# Patient Record
Sex: FEMALE | Race: WHITE
[De-identification: names, ages, dates, MRNs, and addresses within clinical notes are randomized per-mention and may not be internally consistent; named-entity substitution may affect disease eponyms.]

---

## 2018-06-23 ENCOUNTER — HOSPITAL ENCOUNTER (EMERGENCY)
Dept: HOSPITAL 97 - ER | Age: 28
Discharge: HOME | End: 2018-06-23
Payer: SELF-PAY

## 2018-06-23 DIAGNOSIS — L03.314: Primary | ICD-10-CM

## 2018-06-23 DIAGNOSIS — F17.210: ICD-10-CM

## 2018-06-23 DIAGNOSIS — Z88.8: ICD-10-CM

## 2018-06-23 PROCEDURE — 99284 EMERGENCY DEPT VISIT MOD MDM: CPT

## 2018-06-23 NOTE — EDPHYS
Physician Documentation                                                                           

 [facility]                                                                

Name: Glenys Lala                                                                             

Age: 28 yrs                                                                                       

Sex: Female                                                                                       

: 1990                                                                                   

MRN: [id number]                                                                                   

Arrival Date: 2018                                                                          

Time: 10:44                                                                                       

Account#: [id number]                                                                            

Bed 15                                                                                            

Private MD: None, None                                                                            

ED Physician Jason Brown                                                                       

HPI:                                                                                              

                                                                                             

11:04 This 28 yrs old  Female presents to ER via Ambulatory with complaints of Rash. snw 

11:04 The patient's rash thought to be caused by tenderness to bilateral labia majora, hx of  snw 

      abscesses to same area. The rash is located on the left labia majora and right labia        

      majora. The rash can be described as tenderness. Onset: The symptoms/episode                

      began/occurred gradually, 1 week(s) ago, and became persistent. Severity of symptoms:       

      At their worst the symptoms were mild moderate. Treatment given at home: sitz baths.        

      The patient has experienced similar episodes in the past. It is unknown whether or not      

      the patient has recently seen a physician.                                                  

                                                                                                  

OB/GYN:                                                                                           

10:50 LMP 2013                                                                            jl7 

                                                                                                  

Historical:                                                                                       

- Allergies:                                                                                      

11:01 Iodine;                                                                                 jl7 

- Home Meds:                                                                                      

11:01 None [Active];                                                                          jl7 

- PMHx:                                                                                           

11:01 "mental health"; Bipolar disorder; UTI;                                                 jl7 

- PSHx:                                                                                           

11:01 None;                                                                                   jl7 

                                                                                                  

- Immunization history:: Adult Immunizations not up to date.                                      

- Social history:: Smoking status: Patient uses tobacco products, smokes one-half pack            

  cigarettes per day.                                                                             

- Ebola Screening: : No symptoms or risks identified at this time.                                

                                                                                                  

                                                                                                  

ROS:                                                                                              

11:02 Constitutional: Negative for fever, chills, and weight loss, Eyes: Negative for injury, snw 

      pain, redness, and discharge, ENT: Negative for injury, pain, and discharge, Neck:          

      Negative for injury, pain, and swelling, Cardiovascular: Negative for chest pain,           

      palpitations, and edema, Respiratory: Negative for shortness of breath, cough,              

      wheezing, and pleuritic chest pain, Abdomen/GI: Negative for abdominal pain, nausea,        

      vomiting, diarrhea, and constipation, Back: Negative for injury and pain, MS/Extremity:     

      Negative for injury and deformity, Skin: Negative for injury, rash, and discoloration,      

      Neuro: Negative for headache, weakness, numbness, tingling, and seizure.                    

11:02 : Positive for of the right labia majora and left labia majora, tenderness, possible      

      abscess.                                                                                    

                                                                                                  

Exam:                                                                                             

10:59 Constitutional:  This is a well developed, well nourished patient who is awake, alert,  snw 

      and in no acute distress. Head/Face:  Normocephalic, atraumatic. Eyes:  Pupils equal        

      round and reactive to light, extra-ocular motions intact.  Lids and lashes normal.          

      Conjunctiva and sclera are non-icteric and not injected.  Cornea within normal limits.      

      Periorbital areas with no swelling, redness, or edema. ENT:  Nares patent. No nasal         

      discharge, no septal abnormalities noted.  Tympanic membranes are normal and external       

      auditory canals are clear.  Oropharynx with no redness, swelling, or masses, exudates,      

      or evidence of obstruction, uvula midline.  Mucous membranes moist. Neck:  Trachea          

      midline, no thyromegaly or masses palpated, and no cervical lymphadenopathy.  Supple,       

      full range of motion without nuchal rigidity, or vertebral point tenderness.  No            

      Meningismus. Chest/axilla:  Normal chest wall appearance and motion.  Nontender with no     

      deformity.  No lesions are appreciated. Cardiovascular:  Regular rate and rhythm with a     

      normal S1 and S2.  No gallops, murmurs, or rubs.  Normal PMI, no JVD.  No pulse             

      deficits. Respiratory:  Lungs have equal breath sounds bilaterally, clear to                

      auscultation and percussion.  No rales, rhonchi or wheezes noted.  No increased work of     

      breathing, no retractions or nasal flaring. Abdomen/GI:  Soft, non-tender, with normal      

      bowel sounds.  No distension or tympany.  No guarding or rebound.  No evidence of           

      tenderness throughout. Back:  No spinal tenderness.  No costovertebral tenderness.          

      Full range of motion. Pelvic Exam:  Normal external genitalia with tenderness to            

      bilateral labia majora, old scarring noted.  Mucoid discharge, no bleeding noted Skin:      

      Warm, dry with normal turgor.  Normal color with no rashes, no lesions, and no evidence     

      of cellulitis. MS/ Extremity:  Pulses equal, no cyanosis.  Neurovascular intact.  Full,     

      normal range of motion. Neuro:  Awake and alert, GCS 15, oriented to person, place,         

      time, and situation.  Cranial nerves II-XII grossly intact.  Motor strength 5/5 in all      

      extremities.  Sensory grossly intact.  Cerebellar exam normal.  Normal gait. Psych:         

      Awake, alert, with orientation to person, place and time.  Behavior, mood, and affect       

      are within normal limits.                                                                   

                                                                                                  

Vital Signs:                                                                                      

10:50  / 87; Pulse 89; Resp 16 S; Temp 98.7(O); Pulse Ox 99% on R/A; Weight 63.5 kg     jl7 

      (R); Height 5 ft. 2 in. (157.48 cm) (R); Pain 7/10;                                         

10:50 Body Mass Index 25.61 (63.50 kg, 157.48 cm)                                             7 

                                                                                                  

MDM:                                                                                              

10:53 Patient medically screened.                                                             snw 

11:15 Data reviewed: vital signs, nurses notes. Data interpreted: Pulse oximetry: on room air snw 

      is 99 %. Interpretation: normal. Test interpretation: by ED physician or midlevel           

      provider:. Counseling: I had a detailed discussion with the patient and/or guardian         

      regarding: the historical points, exam findings, and any diagnostic results supporting      

      the discharge/admit diagnosis, the presence of at least one elevated blood pressure         

      reading (>120/80) during this emergency department visit, the need for outpatient           

      follow up, for definitive care, to return to the emergency department if symptoms           

      worsen or persist or if there are any questions or concerns that arise at home. Special     

      discussion: Based on the history and exam findings, there is no indication for further      

      emergent testing or inpatient evaluation. I discussed with the patient/guardian the         

      need to see the OB Gyne specialist for further evaluation of the symptoms.                  

                                                                                                  

Administered Medications:                                                                         

11:14 Drug: Doxycycline 100 mg Route: PO;                                                     [facility] 

11:14 Follow up: Response: Medication administered at discharge.                              [facility] 

11:14 Drug: Hibiclens 4 % 1 application Route: Topical; Site: affected area;                  jl7 

11:14 Follow up: Response: Medication administered at discharge.                              7 

11:14 Drug: UltRAM 50 mg Route: PO;                                                           7 

11:14 Follow up: Response: Medication administered at discharge.                              7 

                                                                                                  

                                                                                                  

Disposition:                                                                                      

13:35 Co-signature as Attending Physician, Jason Brown MD I agree with the assessment and   kdr 

      plan of care.                                                                               

                                                                                                  

Disposition:                                                                                      

18 11:09 Discharged to Home. Impression: Cellulitis of groin.                               

- Condition is Stable.                                                                            

- Discharge Instructions: Cellulitis, Hidradenitis Suppurativa, Sitz Bath.                        

- Prescriptions for Doxycycline Hyclate 100 mg Oral Tablet - take 1 tablet by ORAL                

  route every 12 hours; 20 tablet.                                                                

- Medication Reconciliation Form, Thank You Letter, Antibiotic Education, Prescription            

  Opioid Use form.                                                                                

- Work release form (18 14:46).                                                         sp  

- Follow up: Danica Ferguson MD; When: 2 - 3 days; Reason: Recheck today's complaints,             

  Continuance of care.                                                                            

                                                                                                  

                                                                                                  

                                                                                                  

Signatures:                                                                                       

Jason Brown MD MD   [facility]                                                  

Meggan Ibarra FNP-C                 FNP-Csnw                                                  

Andre Malik RN                        RN   jl7                                                  

Amira Swenson                            sp                                                   

                                                                                                  

Corrections: (The following items were deleted from the chart)                                    

11:04 10:59 Constitutional: This is a well developed, well nourished patient who is awake,    snw 

      alert, and in no acute distress. Head/Face: Normocephalic, atraumatic. Eyes: Pupils         

      equal round and reactive to light, extra-ocular motions intact. Lids and lashes normal.     

      Conjunctiva and sclera are non-icteric and not injected. Cornea within normal limits.       

      Periorbital areas with no swelling, redness, or edema. ENT: Nares patent. No nasal          

      discharge, no septal abnormalities noted. Tympanic membranes are normal and external        

      auditory canals are clear. Oropharynx with no redness, swelling, or masses, exudates,       

      or evidence of obstruction, uvula midline. Mucous membranes moist. Neck: Trachea            

      midline, no thyromegaly or masses palpated, and no cervical lymphadenopathy. Supple,        

      full range of motion without nuchal rigidity, or vertebral point tenderness. No             

      Meningismus. Chest/axilla: Normal chest wall appearance and motion. Nontender with no       

      deformity. No lesions are appreciated. Cardiovascular: Regular rate and rhythm with a       

      normal S1 and S2. No gallops, murmurs, or rubs. Normal PMI, no JVD. No pulse deficits.      

      Respiratory: Lungs have equal breath sounds bilaterally, clear to auscultation and          

      percussion. No rales, rhonchi or wheezes noted. No increased work of breathing, no          

      retractions or nasal flaring. Abdomen/GI: Soft, non-tender, with normal bowel sounds.       

      No distension or tympany. No guarding or rebound. No evidence of tenderness throughout.     

      Back: No spinal tenderness. No costovertebral tenderness. Full range of motion. Pelvic      

      Exam: Normal external genitalia with tenderness to bilateral labia majora, old scarring     

      noted. Mucoid discharge, no bleeding noted Skin: Warm, dry with normal turgor. Normal       

      color with no rashes, no lesions, and no evidence of cellulitis. MS/ Extremity: Pulses      

      equal, no cyanosis. Neurovascular intact. Full, normal range of motion. Neuro: Awake        

      and alert, GCS 15, oriented to person, place, time, and situation. Cranial nerves           

      II-XII grossly intact. Motor strength 5/5 in all extremities. Sensory grossly intact.       

      Cerebellar exam normal. Normal gait. Psych: Awake, alert, with orientation to person,       

      place and time. Behavior, mood, and affect are within normal limits. snw                    

11:31 11:09 2018 11:09 Discharged to Home. Impression: Cellulitis of groin. Condition   jl7 

      is Stable. Forms are Medication Reconciliation Form, Thank You Letter, Antibiotic           

      Education, Prescription Opioid Use. Follow up: Danica Ferguson; When: 2 - 3 days; Reason:      

      Recheck today's complaints, Continuance of care. snw                                        

                                                                                                  

**************************************************************************************************

## 2018-06-23 NOTE — ER
Nurse's Notes                                                                                     

 [facility]                                                                

Name: Glenys Lala                                                                             

Age: 28 yrs                                                                                       

Sex: Female                                                                                       

: 1990                                                                                   

MRN: [id number]                                                                                   

Arrival Date: 2018                                                                          

Time: 10:44                                                                                       

Account#: [id number]                                                                            

Bed 15                                                                                            

Private MD: None, None                                                                            

Diagnosis: Cellulitis of groin                                                                    

                                                                                                  

Presentation:                                                                                     

                                                                                             

10:51 Presenting complaint: Patient states: "I have a boil on each side of my lady parts.".   jl7 

      Transition of care: patient was not received from another setting of care. Onset of         

      symptoms was 2018. Risk Assessment: Do you want to hurt yourself or someone        

      else? Patient reports no desire to harm self or others. Initial Sepsis Screen: Does the     

      patient meet any 2 criteria? No. Patient's initial sepsis screen is negative. Does the      

      patient have a suspected source of infection? No. Patient's initial sepsis screen is        

      negative. Care prior to arrival: None.                                                      

10:51 Method Of Arrival: Ambulatory                                                           [facility] 

10:51 Acuity: ANAHY 4                                                                           jl7 

                                                                                                  

Triage Assessment:                                                                                

10:50 General: Appears in no apparent distress. uncomfortable, Behavior is calm, cooperative, jl7 

      appropriate for age. Pain: Complains of pain in right labia majora and left labia           

      majora Pain does not radiate. Pain currently is 7 out of 10 on a pain scale. Pain began     

      1 day ago. Is continuous. EENT: No signs and/or symptoms were reported regarding the        

      EENT system. Neuro: Level of Consciousness is awake, alert, obeys commands.                 

      Cardiovascular: Patient's skin is warm and dry. Respiratory: Airway is patent               

      Respiratory effort is even, unlabored, Respiratory pattern is regular, symmetrical. GI:     

      No signs and/or symptoms were reported involving the gastrointestinal system. :           

      Vaginal discharge is yellow. Derm: Skin is pink, warm \T\ dry. Abscess located on right     

      labia majora and left labia majora is dime sized, has no drainage.                          

                                                                                                  

OB/GYN:                                                                                           

10:50 LMP 2013                                                                            jl7 

                                                                                                  

Historical:                                                                                       

- Allergies:                                                                                      

11:01 Iodine;                                                                                 jl7 

- Home Meds:                                                                                      

11:01 None [Active];                                                                          jl7 

- PMHx:                                                                                           

11:01 "mental health"; Bipolar disorder; UTI;                                                 jl7 

- PSHx:                                                                                           

11:01 None;                                                                                   jl7 

                                                                                                  

- Immunization history:: Adult Immunizations not up to date.                                      

- Social history:: Smoking status: Patient uses tobacco products, smokes one-half pack            

  cigarettes per day.                                                                             

- Ebola Screening: : No symptoms or risks identified at this time.                                

                                                                                                  

                                                                                                  

Screening:                                                                                        

10:55 Abuse screen: Denies threats or abuse. Denies injuries from another. Nutritional        jl7 

      screening: No deficits noted. Tuberculosis screening: No symptoms or risk factors           

      identified. Fall Risk None identified.                                                      

                                                                                                  

Assessment:                                                                                       

10:55 General: See triage assessment.                                                         jl7 

                                                                                                  

Vital Signs:                                                                                      

10:50  / 87; Pulse 89; Resp 16 S; Temp 98.7(O); Pulse Ox 99% on R/A; Weight 63.5 kg     jl7 

      (R); Height 5 ft. 2 in. (157.48 cm) (R); Pain 7/10;                                         

10:50 Body Mass Index 25.61 (63.50 kg, 157.48 cm)                                             jl7 

                                                                                                  

ED Course:                                                                                        

10:44 Patient arrived in ED.                                                                  mr  

10:45 None, None is Private Physician.                                                        mr  

10:49 Andre Malik RN is Primary Nurse.                                                      jl7 

10:50 Arm band placed on right wrist.                                                         jl7 

10:52 Meggan Ibarra FNP-C is PHCP.                                                        snw 

10:52 Jason Brown MD is Attending Physician.                                              snw 

10:52 Triage completed.                                                                       jl7 

10:55 Patient has correct armband on for positive identification. Placed in gown. Bed in low  jl7 

      position. Call light in reach. Pulse ox on. NIBP on.                                        

10:55 Assist provider with pelvic exam:. Patient did not have IV access during this emergency jl7 

      room visit.                                                                                 

11:06 Danica Ferguson MD is Referral Physician.                                                snw 

                                                                                                  

Administered Medications:                                                                         

11:14 Drug: Doxycycline 100 mg Route: PO;                                                     jl7 

11:14 Follow up: Response: Medication administered at discharge.                              jl7 

11:14 Drug: Hibiclens 4 % 1 application Route: Topical; Site: affected area;                  jl7 

11:14 Follow up: Response: Medication administered at discharge.                              jl7 

11:14 Drug: UltRAM 50 mg Route: PO;                                                           jl7 

11:14 Follow up: Response: Medication administered at discharge.                              jl7 

                                                                                                  

                                                                                                  

Outcome:                                                                                          

11:09 Discharge ordered by MD.                                                                snw 

11:20 Discharged to home ambulatory.                                                          jl7 

11:20 Condition: stable                                                                           

11:20 Discharge instructions given to patient, Instructed on discharge instructions, follow       

      up and referral plans. medication usage, Demonstrated understanding of instructions,        

      follow-up care, medications, Prescriptions given X 1.                                       

11:31 Patient left the ED.                                                                    jl7 

                                                                                                  

Signatures:                                                                                       

Meggan Ibarra, FNP-C                 FNP-[place]                                                  

Kassandra Gross Jahala, RN                        RN   jl7                                                  

                                                                                                  

**************************************************************************************************

## 2018-06-27 ENCOUNTER — HOSPITAL ENCOUNTER (EMERGENCY)
Dept: HOSPITAL 97 - ER | Age: 28
Discharge: HOME | End: 2018-06-27
Payer: SELF-PAY

## 2018-06-27 DIAGNOSIS — H81.42: Primary | ICD-10-CM

## 2018-06-27 DIAGNOSIS — H66.002: ICD-10-CM

## 2018-06-27 PROCEDURE — 81003 URINALYSIS AUTO W/O SCOPE: CPT

## 2018-06-27 PROCEDURE — 99283 EMERGENCY DEPT VISIT LOW MDM: CPT

## 2018-06-27 PROCEDURE — 81025 URINE PREGNANCY TEST: CPT

## 2018-06-27 NOTE — EDPHYS
Physician Documentation                                                                           

 [facility]                                                                

Name: Glenys Lala                                                                             

Age: 28 yrs                                                                                       

Sex: Female                                                                                       

: 1990                                                                                   

MRN: [id number]                                                                                   

Arrival Date: 2018                                                                          

Time: 09:02                                                                                       

Account#: [id number]                                                                            

Bed 8                                                                                             

Private MD: None, None                                                                            

ED Physician Carlitos Byrd                                                                      

HPI:                                                                                              

                                                                                             

09:47 This 28 yrs old  Female presents to ER via Ambulatory with complaints of       gabriel 

      Abscess, Vomiting.                                                                          

09:47 The patient presents with an abscess of the groin. Description: swollen. Onset: The     gabriel 

      symptoms/episode began/occurred 2 day(s) ago. Possible cause(s): unknown. Associated        

      signs and symptoms: The patient has no apparent associated signs or symptoms. Modifying     

      factors: the symptoms are alleviated by remaining still, the symptoms are aggravated by     

      movement, walking, pressure, squeezing the lesion and expressing the contents. Severity     

      of symptoms: At their worst the symptoms were mild, in the emergency department the         

      symptoms are unchanged. The patient has not experienced similar symptoms in the past.       

                                                                                                  

OB/GYN:                                                                                           

09:35 LMP N/A - Birth control method                                                          iw  

                                                                                                  

Historical:                                                                                       

- Allergies:                                                                                      

09:36 Iodine;                                                                                 iw  

- PMHx:                                                                                           

09:36 Bipolar disorder; UTI; chronic skin condition;                                          iw  

- PSHx:                                                                                           

09:36 None;                                                                                   iw  

                                                                                                  

- Immunization history:: Adult Immunizations not up to date.                                      

- Social history:: Smoking status: Patient uses tobacco products, smokes one-half pack            

  cigarettes per day.                                                                             

- Ebola Screening: : Patient negative for fever greater than or equal to 101.5 degrees            

  Fahrenheit, and additional compatible Ebola Virus Disease symptoms Patient denies               

  exposure to infectious person Patient denies travel to an Ebola-affected area in the            

  21 days before illness onset No symptoms or risks identified at this time.                      

- Family history:: not pertinent.                                                                 

                                                                                                  

                                                                                                  

ROS:                                                                                              

09:47 Constitutional: Negative for fever, chills, and weight loss, Eyes: Negative for injury, gabriel 

      pain, redness, and discharge, ENT: Negative for injury, pain, and discharge, Neck:          

      Negative for injury, pain, and swelling, Cardiovascular: Negative for chest pain,           

      palpitations, and edema, Respiratory: Negative for shortness of breath, cough,              

      wheezing, and pleuritic chest pain, Abdomen/GI: Negative for abdominal pain, nausea,        

      vomiting, diarrhea, and constipation, Back: Negative for injury and pain, : Negative      

      for injury, bleeding, discharge, and swelling, MS/Extremity: Negative for injury and        

      deformity, Neuro: Negative for headache, weakness, numbness, tingling, and seizure,         

      Psych: Negative for depression, anxiety, suicide ideation, homicidal ideation, and          

      hallucinations, Allergy/Immunology: Negative for hives, rash, and allergies, Endocrine:     

      Negative for neck swelling, polydipsia, polyuria, polyphagia, and marked weight             

      changes, Hematologic/Lymphatic: Negative for swollen nodes, abnormal bleeding, and          

      unusual bruising.                                                                           

09:47 Skin: Positive for erythema.                                                                

                                                                                                  

Exam:                                                                                             

09:47 Constitutional:  This is a well developed, well nourished patient who is awake, alert,  gabriel 

      and in no acute distress. Head/Face:  Normocephalic, atraumatic. Eyes:  Pupils equal        

      round and reactive to light, extra-ocular motions intact.  Lids and lashes normal.          

      Conjunctiva and sclera are non-icteric and not injected.  Cornea within normal limits.      

      Periorbital areas with no swelling, redness, or edema. ENT:  Nares patent. No nasal         

      discharge, no septal abnormalities noted.  Tympanic membranes are normal and external       

      auditory canals are clear.  Oropharynx with no redness, swelling, or masses, exudates,      

      or evidence of obstruction, uvula midline.  Mucous membranes moist. Neck:  Trachea          

      midline, no thyromegaly or masses palpated, and no cervical lymphadenopathy.  Supple,       

      full range of motion without nuchal rigidity, or vertebral point tenderness.  No            

      Meningismus. Chest/axilla:  Normal chest wall appearance and motion.  Nontender with no     

      deformity.  No lesions are appreciated. Cardiovascular:  Regular rate and rhythm with a     

      normal S1 and S2.  No gallops, murmurs, or rubs.  Normal PMI, no JVD.  No pulse             

      deficits. Respiratory:  Lungs have equal breath sounds bilaterally, clear to                

      auscultation and percussion.  No rales, rhonchi or wheezes noted.  No increased work of     

      breathing, no retractions or nasal flaring. Abdomen/GI:  Soft, non-tender, with normal      

      bowel sounds.  No distension or tympany.  No guarding or rebound.  No evidence of           

      tenderness throughout. Back:  No spinal tenderness.  No costovertebral tenderness.          

      Full range of motion. MS/ Extremity:  Pulses equal, no cyanosis.  Neurovascular intact.     

       Full, normal range of motion. Neuro:  Awake and alert, GCS 15, oriented to person,         

      place, time, and situation.  Cranial nerves II-XII grossly intact.  Motor strength 5/5      

      in all extremities.  Sensory grossly intact.  Cerebellar exam normal.  Normal gait.         

      Psych:  Awake, alert, with orientation to person, place and time.  Behavior, mood, and      

      affect are within normal limits.                                                            

09:47 Skin: cellulitis, that is minimal, induration, that is mild is noted, injury, is not        

      appreciated, no rash present.                                                               

                                                                                                  

Vital Signs:                                                                                      

09:35  / 90; Pulse 92; Resp 16; Temp 98.2(TE); Pulse Ox 99% on R/A; Weight 63.5 kg;     iw  

      Height 5 ft. 2 in. (157.48 cm); Pain 3/10;                                                  

09:35 Body Mass Index 25.60 (63.50 kg, 157.48 cm)                                             iw  

                                                                                                  

MDM:                                                                                              

09:31 Patient medically screened.                                                             [facility] 

09:47 Data reviewed: vital signs, nurses notes, lab test result(s).                           [facility] 

                                                                                                  

                                                                                             

10:01 Order name: Urine Dipstick--Ancillary (enter results)                                   [facility] 

                                                                                             

10:01 Order name: Urine Pregnancy--Ancillary (enter results)                                  [facility] 

                                                                                             

09:47 Order name: Urine Dipstick-Ancillary (obtain specimen); Complete Time: 09:59            [facility] 

                                                                                             

09:47 Order name: Urine Pregnancy Test (obtain specimen); Complete Time: 09:59                [facility] 

                                                                                                  

Administered Medications:                                                                         

10:16 Drug: Doxycycline 200 mg Route: PO;                                                     ph  

10:17 Follow up: Response: No adverse reaction; Medication administered at discharge.         ph  

10:16 Drug: Bactrim (160 mg-800 mg (DS) 1 tablet Route: PO;                                   ph  

10:17 Follow up: Response: No adverse reaction; Medication administered at discharge.         ph  

10:16 Drug: Zofran 4 mg Route: PO;                                                            ph  

11:00 Follow up: Response: No adverse reaction                                                ph  

                                                                                                  

                                                                                                  

Disposition:                                                                                      

18 09:53 Discharged to Home. Impression: Hidradenitis suppurativa.                          

- Condition is Stable.                                                                            

- Discharge Instructions: Hidradenitis Suppurativa, Sitz Bath.                                    

- Prescriptions for Tylenol- Codeine #3 300-30 mg Oral Tablet - take 2 tablet by ORAL             

  route every 6 hours As needed; 30 tablet. Bactrim - 160 mg Oral Tablet - take 1           

  tablet by ORAL route every 12 hours for 10 days; 20 tablet. Doxycycline Monohydrate             

  100 mg Oral Tablet - take 1 tablet by ORAL route every 12 hours for 10 days; 20                 

  tablet. Zofran 4 mg Oral Tablet - take 1 tablet by ORAL route every 12 hours As                 

  needed; 20 tablet.                                                                              

- Medication Reconciliation Form, Thank You Letter, Antibiotic Education, Prescription            

  Opioid Use, Work release form form.                                                             

- Follow up: Private Physician; When: 2 - 3 days; Reason: Recheck today's complaints,             

  Continuance of care, Re-evaluation by your physician. Follow up: Addison Martinez MD;              

  When: 2 - 3 days; Reason: Recheck today's complaints, Continuance of care,                      

  Re-evaluation by your physician.                                                                

- Problem is new.                                                                                 

- Symptoms have improved.                                                                         

                                                                                                  

                                                                                                  

                                                                                                  

Signatures:                                                                                       

Dispatcher [employer]                           EDCarlitos Turner MD MD cha Williams, Irene, RN                     RN                                                      

Peg Mendoza RN                      RN   ph                                                   

                                                                                                  

Corrections: (The following items were deleted from the chart)                                    

10:17 09:53 2018 09:53 Discharged to Home. Impression: Hidradenitis suppurativa.        ph  

      Condition is Stable. Forms are Medication Reconciliation Form, Thank You Letter,            

      Antibiotic Education, Prescription Opioid Use. Follow up: Private Physician; When: 2 -      

      3 days; Reason: Recheck today's complaints, Continuance of care, Re-evaluation by your      

      physician. Follow up: Addison Martinez; When: 2 - 3 days; Reason: Recheck today's                

      complaints, Continuance of care, Re-evaluation by your physician. Problem is new.           

      Symptoms have improved. gabriel                                                                 

                                                                                                  

**************************************************************************************************

## 2018-06-27 NOTE — ER
Nurse's Notes                                                                                     

 [facility]                                                                

Name: Glenys Lala                                                                             

Age: 28 yrs                                                                                       

Sex: Female                                                                                       

: 1990                                                                                   

MRN: [id number]                                                                                   

Arrival Date: 2018                                                                          

Time: 09:02                                                                                       

Account#: [id number]                                                                            

Bed 8                                                                                             

Private MD: None, None                                                                            

Diagnosis: Hidradenitis suppurativa                                                               

                                                                                                  

Presentation:                                                                                     

                                                                                             

09:30 Presenting complaint: Presenting complaint: Patient states: was seen here over the      iw  

      weekend, had abscess to both sides of her groin, was told that it wasn't ready to be        

      lanced and was sent home with antibiotics, wa snot able to fill abx because she             

      couldn't afford it, abscess opened up the next day and now she started vomiting and         

      doesn't feel well, denies fever or chills.                                                  

09:30 Transition of care: patient was not received from another setting of care. Onset of       

      symptoms was 2018. Risk Assessment: Do you want to hurt yourself or someone        

      else? Patient reports no desire to harm self or others. Initial Sepsis Screen: Does the     

      patient meet any 2 criteria? No. Patient's initial sepsis screen is negative. Does the      

      patient have a suspected source of infection? No. Patient's initial sepsis screen is        

      negative. Care prior to arrival: None.                                                      

09:30 Method Of Arrival: Ambulatory                                                             

09:30 Acuity: ANAHY 3                                                                           iw  

                                                                                                  

OB/GYN:                                                                                           

09:35 LMP N/A - Birth control method                                                          iw  

                                                                                                  

Historical:                                                                                       

- Allergies:                                                                                      

09:36 Iodine;                                                                                 iw  

- PMHx:                                                                                           

09:36 Bipolar disorder; UTI; chronic skin condition;                                          iw  

- PSHx:                                                                                           

09:36 None;                                                                                   iw  

                                                                                                  

- Immunization history:: Adult Immunizations not up to date.                                      

- Social history:: Smoking status: Patient uses tobacco products, smokes one-half pack            

  cigarettes per day.                                                                             

- Ebola Screening: : Patient negative for fever greater than or equal to 101.5 degrees            

  Fahrenheit, and additional compatible Ebola Virus Disease symptoms Patient denies               

  exposure to infectious person Patient denies travel to an Ebola-affected area in the            

  21 days before illness onset No symptoms or risks identified at this time.                      

- Family history:: not pertinent.                                                                 

                                                                                                  

                                                                                                  

Screening:                                                                                        

10:00 Abuse screen: Denies threats or abuse. Denies injuries from another. Nutritional        ph  

      screening: No deficits noted. Tuberculosis screening: No symptoms or risk factors           

      identified. Fall Risk None identified.                                                      

                                                                                                  

Assessment:                                                                                       

09:30 General: Appears in no apparent distress. uncomfortable, well groomed, Behavior is      ph  

      calm, cooperative, appropriate for age, Denies fever, chills. Pain: Complains of pain       

      in groin. Neuro: Level of Consciousness is awake, alert, obeys commands, Oriented to        

      person, place, time, situation. Cardiovascular: Capillary refill < 3 seconds Patient's      

      skin is warm and dry. Respiratory: Airway is patent Respiratory effort is even,             

      unlabored, Respiratory pattern is regular, symmetrical. GI: Abdomen is round                

      non-distended, Bowel sounds present X 4 quads. Abd is soft and non tender X 4 quads.        

      Reports nausea, vomiting, Patient currently denies abdominal pain, diarrhea. : No         

      signs and/or symptoms were reported regarding the genitourinary system. Derm: Skin is       

      healthy with good turgor, Skin is pink, warm \T\ dry. Abscess located on right labia        

      majora and left labia majora is dime sized, has no drainage, is red, is raised.             

      Musculoskeletal: Circulation, motion, and sensation intact. Range of motion: intact in      

      all extremities.                                                                            

                                                                                                  

Vital Signs:                                                                                      

09:35  / 90; Pulse 92; Resp 16; Temp 98.2(TE); Pulse Ox 99% on R/A; Weight 63.5 kg;     iw  

      Height 5 ft. 2 in. (157.48 cm); Pain 3/10;                                                  

09:35 Body Mass Index 25.60 (63.50 kg, 157.48 cm)                                             iw  

                                                                                                  

ED Course:                                                                                        

09:02 Patient arrived in ED.                                                                  mr  

09:02 None, None is Private Physician.                                                        mr  

09:29 Peg Menodza, RN is Primary Nurse.                                                    ph  

09:31 Carlitos Byrd MD is Attending Physician.                                             gabriel 

09:35 Triage completed.                                                                       iw  

09:35 Arm band placed on.                                                                     iw  

09:51 Addison Martinez MD is Referral Physician.                                                 gabriel 

10:00 Patient has correct armband on for positive identification. Placed in gown. Bed in low  ph  

      position. Call light in reach. Side rails up X 1. Pulse ox on. NIBP on. Warm blanket        

      given.                                                                                      

10:17 No provider procedures requiring assistance completed. Patient did not have IV access   ph  

      during this emergency room visit.                                                           

                                                                                                  

Administered Medications:                                                                         

10:16 Drug: Doxycycline 200 mg Route: PO;                                                     ph  

10:17 Follow up: Response: No adverse reaction; Medication administered at discharge.         ph  

10:16 Drug: Bactrim (160 mg-800 mg (DS) 1 tablet Route: PO;                                   ph  

10:17 Follow up: Response: No adverse reaction; Medication administered at discharge.         ph  

10:16 Drug: Zofran 4 mg Route: PO;                                                            ph  

11:00 Follow up: Response: No adverse reaction                                                ph  

                                                                                                  

                                                                                                  

Outcome:                                                                                          

09:53 Discharge ordered by MD.                                                                [facility] 

10:17 Patient left the ED.                                                                    ph  

10:17 Discharged to home ambulatory.                                                          ph  

10:17 Condition: good                                                                             

10:17 Discharge instructions given to patient, Instructed on discharge instructions, follow       

      up and referral plans. medication usage, Demonstrated understanding of instructions,        

      follow-up care, medications, Prescriptions given X 4.                                       

                                                                                                  

Signatures:                                                                                       

Carlitos Byrd MD MD cha Rivera, Maria mr Williams, Irene, RN                     RN                                                      

Peg Mendoza RN                      RN   ph                                                   

                                                                                                  

Corrections: (The following items were deleted from the chart)                                    

09:35 09:30 Presenting complaint: iw                                                          iw  

09:56 09:35  / 90; Pulse 92bpm; Resp 16bpm; Pulse Ox 99% RA; 63.5 kg; Height 5 ft. 2    iw  

      in.; BMI: 25.6; Pain 3/10; iw                                                               

13: 13:06 Abuse screen: Denies threats or abuse. Denies injuries from another. ph           ph  

: 13:06 Nutritional screening: No deficits noted. ph                                      ph  

: 13:06 Tuberculosis screening: No symptoms or risk factors identified. ph                ph  

: 13:06 Fall Risk None identified. ph                                                     ph  

                                                                                                  

**************************************************************************************************

## 2018-11-18 ENCOUNTER — HOSPITAL ENCOUNTER (EMERGENCY)
Dept: HOSPITAL 97 - ER | Age: 28
Discharge: HOME | End: 2018-11-18
Payer: SELF-PAY

## 2018-11-18 DIAGNOSIS — K59.00: Primary | ICD-10-CM

## 2018-11-18 DIAGNOSIS — R10.84: ICD-10-CM

## 2018-11-18 PROCEDURE — 81003 URINALYSIS AUTO W/O SCOPE: CPT

## 2018-11-18 PROCEDURE — 81025 URINE PREGNANCY TEST: CPT

## 2018-11-18 PROCEDURE — 99284 EMERGENCY DEPT VISIT MOD MDM: CPT

## 2018-11-18 NOTE — EDPHYS
Physician Documentation                                                                           

 [facility]                                                                

Name: Glenys Lala                                                                             

Age: 28 yrs                                                                                       

Sex: Female                                                                                       

: 1990                                                                                   

MRN: [id number]                                                                                   

Arrival Date: 2018                                                                          

Time: 11:10                                                                                       

Account#: [id number]                                                                            

Bed 7                                                                                             

Private MD: None, None                                                                            

ED Physician Abhinav Webb                                                                       

HPI:                                                                                              

                                                                                             

12:32 This 28 yrs old  Female presents to ER via Ambulatory with complaints of       gs  

      Abdominal Pain.                                                                             

12:32 The patient presents with abdominal pain that is diffuse. Onset: The symptoms/episode   gs  

      began/occurred 1 week(s) ago. Associated signs and symptoms: Pertinent positives:           

      constipation. Associated signs and symptoms: Pertinent negatives: nausea and vomiting,      

      blood in stools, chest pain, diarrhea, palpitations, shortness of breath, vomiting          

      blood. The symptoms are described as crampy. Modifying factors: The symptoms are            

      alleviated by nothing, the symptoms are aggravated by nothing. Severity of pain: At its     

      worst the pain was moderate in the emergency department the pain has improved markedly.     

      The patient has experienced similar episodes in the past, chronically.                      

                                                                                                  

OB/GYN:                                                                                           

11:14 LMP N/A - Birth control method                                                          la1 

                                                                                                  

Historical:                                                                                       

- Allergies:                                                                                      

11:13 Iodine;                                                                                 la1 

- PMHx:                                                                                           

11:13 "mental health"; Bipolar disorder; chronic skin condition; UTI; ibs;                    la1 

                                                                                                  

- Immunization history:: Adult Immunizations up to date.                                          

- Social history:: Smoking status: Patient/guardian denies using tobacco.                         

- Ebola Screening: : No symptoms or risks identified at this time.                                

                                                                                                  

                                                                                                  

ROS:                                                                                              

12:32 All other systems are negative.                                                         gs  

                                                                                                  

Exam:                                                                                             

12:32 Head/Face:  Normocephalic, atraumatic. Eyes:  Pupils equal round and reactive to light, gs  

      extra-ocular motions intact.  Lids and lashes normal.  Conjunctiva and sclera are           

      non-icteric and not injected.  Cornea within normal limits.  Periorbital areas with no      

      swelling, redness, or edema. ENT:  Nares patent. No nasal discharge, no septal              

      abnormalities noted.  Tympanic membranes are normal and external auditory canals are        

      clear.  Oropharynx with no redness, swelling, or masses, exudates, or evidence of           

      obstruction, uvula midline.  Mucous membranes moist. Neck:  Trachea midline, no             

      thyromegaly or masses palpated, and no cervical lymphadenopathy.  Supple, full range of     

      motion without nuchal rigidity, or vertebral point tenderness.  No Meningismus.             

      Chest/axilla:  Normal chest wall appearance and motion.  Nontender with no deformity.       

      No lesions are appreciated. Cardiovascular:  Regular rate and rhythm with a normal S1       

      and S2.  No gallops, murmurs, or rubs.  Normal PMI, no JVD.  No pulse deficits.             

      Respiratory:  Lungs have equal breath sounds bilaterally, clear to auscultation and         

      percussion.  No rales, rhonchi or wheezes noted.  No increased work of breathing, no        

      retractions or nasal flaring. Abdomen/GI:  Soft, non-tender, with normal bowel sounds.      

      No distension or tympany.  No guarding or rebound.  No evidence of tenderness               

      throughout. Back:  No spinal tenderness.  No costovertebral tenderness.  Full range of      

      motion. Skin:  Warm, dry with normal turgor.  Normal color with no rashes, no lesions,      

      and no evidence of cellulitis. MS/ Extremity:  Pulses equal, no cyanosis.                   

      Neurovascular intact.  Full, normal range of motion.                                        

12:32 Constitutional: The patient appears alert, awake.                                           

12:44 Neuro:  Awake and alert, GCS 15, oriented to person, place, time, and situation.        gs  

      Cranial nerves II-XII grossly intact.  Motor strength 5/5 in all extremities.  Sensory      

      grossly intact.  Cerebellar exam normal.  Normal gait.                                      

                                                                                                  

Vital Signs:                                                                                      

11:14  / 79; Pulse 96; Resp 16; Temp 98.2; Pulse Ox 98% on R/A; Weight 57.61 kg; Height la1 

      5 ft. 4 in. (162.56 cm);                                                                    

12:00  / 78; Pulse 88; Resp 15; Pulse Ox 100% on R/A;                                   hb  

13:00  / 76; Pulse 88; Resp 16; Pulse Ox 100% on R/A;                                   hb  

11:14 Body Mass Index 21.80 (57.61 kg, 162.56 cm)                                             la1 

                                                                                                  

MDM:                                                                                              

11:26 Patient medically screened.                                                             gs  

12:32 Data reviewed: vital signs, nurses notes.                                               gs  

12:44 Differential diagnosis: non-specific abd pain, constipation, chronic pain.              gs  

12:46 Response to treatment: the patient's symptoms have mildly improved after treatment, and gs  

      as a result, I will discharge patient. ED course: pt states needs a bentyl rx               

      understands risks with constipation says has ibs-c and bentyl does work.                    

                                                                                                  

                                                                                             

11:31 Order name: Urine Dipstick--Ancillary (enter results); Complete Time: 12:53             eb  

                                                                                             

11:31 Order name: Urine Pregnancy--Ancillary (enter results); Complete Time: 12:53            eb  

                                                                                                  

Administered Medications:                                                                         

11:35 Drug: Magnesium Citrate Liquid 300 ml Route: PO;                                        hb  

12:22 Follow up: Response: No adverse reaction                                                hb  

                                                                                                  

                                                                                                  

Disposition:                                                                                      

18 12:45 Discharged to Home. Impression: Generalized abdominal pain, Constipation.          

- Condition is Stable.                                                                            

- Discharge Instructions: Constipation, Adult, Easy-to-Read.                                      

- Prescriptions for Bentyl 10 mg Oral Capsule - take 1 capsule by ORAL route every 6              

  hours As needed; 15 capsule.                                                                    

- Medication Reconciliation Form, Thank You Letter, Antibiotic Education, Prescription            

  Opioid Use form.                                                                                

- Follow up: Robert Soria MD; When: 2 - 3 days; Reason: Re-evaluation by your                 

  physician.                                                                                      

- Problem is an ongoing problem.                                                                  

- Symptoms have improved.                                                                         

                                                                                                  

                                                                                                  

                                                                                                  

Signatures:                                                                                       

Dispatcher [employer]                           EDMS                                                 

Mike Kidd RN                         RN   la1                                                  

Chelsi Villalba RN                     RN                                                      

WebbAbhinav MD MD                                                      

                                                                                                  

Corrections: (The following items were deleted from the chart)                                    

12:45 12:45 2018 12:45 Discharged to Home. Impression: Generalized abdominal pain;      gs  

      Constipation. Condition is Stable. Forms are Medication Reconciliation Form, Thank You      

      Letter, Antibiotic Education, Prescription Opioid Use. Follow up: Robert Soria; When:     

      2 - 3 days; Reason: Re-evaluation by your physician.                                      

13:09 12:45 2018 12:45 Discharged to Home. Impression: Generalized abdominal pain;      hb  

      Constipation. Condition is Stable. Forms are Medication Reconciliation Form, Thank You      

      Letter, Antibiotic Education, Prescription Opioid Use. Follow up: Robert Soria; When:     

      2 - 3 days; Reason: Re-evaluation by your physician. Problem is an ongoing problem.         

      Symptoms have improved.                                                                   

                                                                                                  

**************************************************************************************************

## 2018-11-18 NOTE — ER
Nurse's Notes                                                                                     

 [facility]                                                                

Name: Glenys Lala                                                                             

Age: 28 yrs                                                                                       

Sex: Female                                                                                       

: 1990                                                                                   

MRN: [id number]                                                                                   

Arrival Date: 2018                                                                          

Time: 11:10                                                                                       

Account#: [id number]                                                                            

Bed 7                                                                                             

Private MD: None, None                                                                            

Diagnosis: Generalized abdominal pain;Constipation                                                

                                                                                                  

Presentation:                                                                                     

                                                                                             

11:13 Presenting complaint: Patient states: I think I am having an IBS flare, usually they    la1 

      give me bentyl 40mg. I also think I have chiggers. Transition of care: patient was not      

      received from another setting of care. Onset of symptoms was 2018. Risk        

      Assessment: Do you want to hurt yourself or someone else? Patient reports no desire to      

      harm self or others. Initial Sepsis Screen: Does the patient meet any 2 criteria? No.       

      Patient's initial sepsis screen is negative. Does the patient have a suspected source       

      of infection? No. Patient's initial sepsis screen is negative. Care prior to arrival:       

      None.                                                                                       

11:13 Method Of Arrival: Ambulatory                                                           la1 

11:13 Acuity: ANAHY 3                                                                           la1 

                                                                                                  

OB/GYN:                                                                                           

11:14 LMP N/A - Birth control method                                                          la1 

                                                                                                  

Historical:                                                                                       

- Allergies:                                                                                      

11:13 Iodine;                                                                                 la1 

- PMHx:                                                                                           

11:13 "mental health"; Bipolar disorder; chronic skin condition; UTI; ibs;                    la1 

                                                                                                  

- Immunization history:: Adult Immunizations up to date.                                          

- Social history:: Smoking status: Patient/guardian denies using tobacco.                         

- Ebola Screening: : No symptoms or risks identified at this time.                                

                                                                                                  

                                                                                                  

Screenin:32 Abuse screen: Denies threats or abuse. Denies injuries from another. Nutritional        hb  

      screening: No deficits noted. Tuberculosis screening: No symptoms or risk factors           

      identified. Fall Risk None identified.                                                      

                                                                                                  

Assessment:                                                                                       

11:32 General: Appears in no apparent distress. Behavior is calm, cooperative. Pain: Pain     hb  

      currently is 4 out of 10 on a pain scale. Neuro: Level of Consciousness is awake,           

      alert, obeys commands, Oriented to person, place, time, situation. Cardiovascular:          

      Capillary refill < 3 seconds Patient's skin is warm and dry. Respiratory: Airway is         

      patent Respiratory effort is even, unlabored, Respiratory pattern is regular,               

      symmetrical, Breath sounds are clear bilaterally. GI: Abdomen is non-distended, Bowel       

      sounds present X 4 quads. Abd is soft and non tender X 4 quads. Reports lower abdominal     

      pain, upper abdominal pain, constipation. : No signs and/or symptoms were reported        

      regarding the genitourinary system. EENT: No signs and/or symptoms were reported            

      regarding the EENT system. Derm: Skin is pink, warm \T\ dry. Musculoskeletal: No signs      

      and/or symptoms reported regarding the musculoskeletal system.                              

12:30 Reassessment: Patient appears in no apparent distress at this time. No changes from     hb  

      previously documented assessment. Patient and/or family updated on plan of care and         

      expected duration. Pain level reassessed. Patient is alert, oriented x 3, equal             

      unlabored respirations, skin warm/dry/pink.                                                 

                                                                                                  

Vital Signs:                                                                                      

11:14  / 79; Pulse 96; Resp 16; Temp 98.2; Pulse Ox 98% on R/A; Weight 57.61 kg; Height la1 

      5 ft. 4 in. (162.56 cm);                                                                    

12:00  / 78; Pulse 88; Resp 15; Pulse Ox 100% on R/A;                                   hb  

13:00  / 76; Pulse 88; Resp 16; Pulse Ox 100% on R/A;                                   hb  

11:14 Body Mass Index 21.80 (57.61 kg, 162.56 cm)                                             la1 

                                                                                                  

ED Course:                                                                                        

11:10 Patient arrived in ED.                                                                  mr  

11:10 None, None is Private Physician.                                                        mr  

11:13 Triage completed.                                                                       la1 

11:14 Mike Kidd, RN is Primary Nurse.                                                       la1 

11:14 Arm band placed on left wrist.                                                          la1 

11:17 Francois Wallace, RN is Primary Nurse.                                                       sg  

11:17 Abhinav Webb MD is Attending Physician.                                              gs  

11:26 Bed in low position. Call light in reach. Side rails up X 1. Side rails up X2. Warm     jp3 

      blanket given. Pillow given. Pulse ox on. NIBP on.                                          

11:26 Urine collected: clean catch specimen, clear, lily colored, Amount Voided: 80mL.       jp3 

11:30 Chelsi Villalba, RN is Primary Nurse.                                                   hb  

12:44 Robert Soria MD is Referral Physician.                                              gs  

13:08 No provider procedures requiring assistance completed. Patient did not have IV access   hb  

      during this emergency room visit.                                                           

                                                                                                  

Administered Medications:                                                                         

11:35 Drug: Magnesium Citrate Liquid 300 ml Route: PO;                                        hb  

12:22 Follow up: Response: No adverse reaction                                                hb  

                                                                                                  

                                                                                                  

Outcome:                                                                                          

12:45 Discharge ordered by MD.                                                                  

13:08 Discharged to home ambulatory, with family.                                               

13:08 Condition: stable                                                                           

13:08 Discharge instructions given to patient, Instructed on discharge instructions, follow       

      up and referral plans. medication usage, Demonstrated understanding of instructions,        

      follow-up care, medications, Prescriptions given X 1.                                       

13:09 Patient left the ED.                                                                      

                                                                                                  

Signatures:                                                                                       

Francois Wallace RN RN                                                      

Renato Imelda                                 mr                                                   

Bernabe, TWILA Mckeon RN   la1                                                  

Chelsi Villalba RN RN                                                      

Abhinav Webb MD MD gs Pisarski, Jacob jp3                                                  

                                                                                                  

**************************************************************************************************

## 2019-12-22 ENCOUNTER — HOSPITAL ENCOUNTER (EMERGENCY)
Dept: HOSPITAL 97 - ER | Age: 29
Discharge: HOME | End: 2019-12-22
Payer: SELF-PAY

## 2019-12-22 VITALS — SYSTOLIC BLOOD PRESSURE: 119 MMHG | DIASTOLIC BLOOD PRESSURE: 75 MMHG | OXYGEN SATURATION: 100 % | TEMPERATURE: 99.7 F

## 2019-12-22 DIAGNOSIS — J02.9: Primary | ICD-10-CM

## 2019-12-22 DIAGNOSIS — F17.210: ICD-10-CM

## 2019-12-22 DIAGNOSIS — Z91.048: ICD-10-CM

## 2019-12-22 PROCEDURE — 87804 INFLUENZA ASSAY W/OPTIC: CPT

## 2019-12-22 PROCEDURE — 87081 CULTURE SCREEN ONLY: CPT

## 2019-12-22 PROCEDURE — 99283 EMERGENCY DEPT VISIT LOW MDM: CPT

## 2019-12-22 PROCEDURE — 87070 CULTURE OTHR SPECIMN AEROBIC: CPT

## 2019-12-22 NOTE — ER
Nurse's Notes                                                                                     

 [facility]                                                                 

Name: Glenys Lala                                                                             

Age: 29 yrs                                                                                       

Sex: Female                                                                                       

: 1990                                                                                   

MRN: [id number]                                                                                   

Arrival Date: 2019                                                                          

Time: 11:13                                                                                       

Account#: [id number]                                                                            

Bed 23                                                                                            

Private MD: None, None                                                                            

Diagnosis: Acute pharyngitis                                                                      

                                                                                                  

Presentation:                                                                                     

                                                                                             

11:25 Presenting complaint: Patient states: fever, sore throat, body aches since last night.  iw  

      Transition of care: patient was not received from another setting of care. Onset of         

      symptoms was 2019. Risk Assessment: Do you want to hurt yourself or            

      someone else? Patient reports no desire to harm self or others. Initial Sepsis Screen:      

      Does the patient meet any 2 criteria? No. Patient's initial sepsis screen is negative.      

      Does the patient have a suspected source of infection? No. Patient's initial sepsis         

      screen is negative. Care prior to arrival: None.                                            

11:25 Method Of Arrival: Ambulatory                                                           iw  

11:25 Acuity: ANAHY 4                                                                           iw  

                                                                                                  

Triage Assessment:                                                                                

12:20 General: Appears in no apparent distress. Behavior is calm, cooperative.                iw  

                                                                                                  

OB/GYN:                                                                                           

11:26 LMP N/A - Birth control method                                                          iw  

                                                                                                  

Historical:                                                                                       

- Allergies:                                                                                      

11:26 Iodine;                                                                                 iw  

- Home Meds:                                                                                      

11:26 None [Active];                                                                          iw  

- PMHx:                                                                                           

11:26 "mental health"; Bipolar disorder; chronic skin condition; ibs; UTI;                    iw  

- PSHx:                                                                                           

11:26 None;                                                                                   iw  

                                                                                                  

- Immunization history:: Adult Immunizations not up to date.                                      

- Social history:: Smoking status: Smoking status: Patient uses tobacco products,                 

  smokes one-half pack cigarettes per day.                                                        

- Ebola Screening: : Patient negative for fever greater than or equal to 101.5 degrees            

  Fahrenheit, and additional compatible Ebola Virus Disease symptoms Patient denies               

  exposure to infectious person Patient denies travel to an Ebola-affected area in the            

  21 days before illness onset No symptoms or risks identified at this time.                      

                                                                                                  

                                                                                                  

Screenin:35 Abuse screen: Denies threats or abuse. Denies injuries from another. Nutritional        iw  

      screening: No deficits noted. Tuberculosis screening: No symptoms or risk factors           

      identified. Fall Risk None identified.                                                      

                                                                                                  

Assessment:                                                                                       

11:35 General: Appears in no apparent distress. Behavior is calm, cooperative. General:       iw  

      Reports fever for feeling ill for. Pain: Complains of pain in body aches. Neuro: Level      

      of Consciousness is awake, alert, obeys commands, Oriented to person, place, time,          

      situation. Cardiovascular: Patient's skin is warm and dry. Respiratory: Respiratory         

      effort is even, unlabored, Respiratory pattern is regular, symmetrical. Derm: Skin is       

      intact, is healthy with good turgor. Musculoskeletal: Range of motion: intact in all        

      extremities.                                                                                

                                                                                                  

Vital Signs:                                                                                      

11:26  / 75; Pulse 118; Resp 18 S; Temp 99.7(TE); Pulse Ox 100% on R/A; Weight 53.52    iw  

      kg; Height 5 ft. 3 in. (160.02 cm); Pain 6/10;                                              

11:26 Body Mass Index 20.90 (53.52 kg, 160.02 cm)                                             iw  

                                                                                                  

ED Course:                                                                                        

11:13 Patient arrived in ED.                                                                  mr  

11:13 None, None is Private Physician.                                                        mr  

11:26 Triage completed.                                                                       iw  

11:26 Arm band placed on.                                                                     iw  

11:35 Patient has correct armband on for positive identification.                             iw  

11:35 No provider procedures requiring assistance completed. Patient did not have IV access   iw  

      during this emergency room visit.                                                           

11:36 [facility]                                                        kb  

11:36 Jason Brown MD is Attending Physician.                                              kb  

11:56 Jayla Brandt, RN is Primary Nurse.                                                   iw  

                                                                                                  

Administered Medications:                                                                         

12:20 Drug: GI Cocktail without Donnatal - (Maalox Suspension 30 ml, Lidocaine Liquid 2 % 15  iw  

      ml) Route: PO;                                                                              

                                                                                                  

                                                                                                  

Outcome:                                                                                          

12:10 Discharge ordered by MD.                                                                kb  

12:26 Discharged to home ambulatory, with family.                                             iw  

12:26 Condition: good                                                                             

12:26 Discharge instructions given to patient, family, Instructed on discharge instructions,      

      follow up and referral plans. Demonstrated understanding of instructions, follow-up         

      care.                                                                                       

12:27 Patient left the ED.                                                                    iw  

                                                                                                  

Signatures:                                                                                       

Sheryl Key FNP-C FNP-Imelda Arrington                                 mr                                                   

Jayla Brandt, RN                     RN   iw                                                   

                                                                                                  

**************************************************************************************************

## 2019-12-22 NOTE — EDPHYS
Physician Documentation                                                                           

 [facility]                                                                 

Name: Glenys Lala                                                                             

Age: 29 yrs                                                                                       

Sex: Female                                                                                       

: 1990                                                                                   

MRN: [id number]                                                                                   

Arrival Date: 2019                                                                          

Time: 11:13                                                                                       

Account#: [id number]                                                                            

Bed 23                                                                                            

Private MD: None, None                                                                            

ED Physician Jason Brown                                                                       

HPI:                                                                                              

                                                                                             

12:09 This 29 yrs old  Female presents to ER via Ambulatory with complaints of Flu   kb  

      Symptoms.                                                                                   

12:09 The patient presents with sore throat. The patient describes throat pain as constant.   kb  

      Onset: The symptoms/episode began/occurred last night. Severity of symptoms: At their       

      worst the symptoms were moderate, in the emergency department the symptoms are              

      unchanged. Modifying factors: The symptoms are alleviated by nothing, the symptoms are      

      aggravated by swallowing, Patient's oral intake status: good. Associated signs and          

      symptoms: Pertinent positives: fever, flu-like symptoms, myalgias, Sore throat. The         

      patient has not experienced similar symptoms in the past. The patient has not recently      

      seen a physician. Pt reports sore throat, fever, chills and body aches since last night.    

                                                                                                  

OB/GYN:                                                                                           

11:26 LMP N/A - Birth control method                                                          iw  

                                                                                                  

Historical:                                                                                       

- Allergies:                                                                                      

11: Iodine;                                                                                 iw  

- Home Meds:                                                                                      

: None [Active];                                                                          iw  

- PMHx:                                                                                           

11: "mental health"; Bipolar disorder; chronic skin condition; ibs; UTI;                    iw  

- PSHx:                                                                                           

11: None;                                                                                   iw  

                                                                                                  

- Immunization history:: Adult Immunizations not up to date.                                      

- Social history:: Smoking status: Smoking status: Patient uses tobacco products,                 

  smokes one-half pack cigarettes per day.                                                        

- Ebola Screening: : Patient negative for fever greater than or equal to 101.5 degrees            

  Fahrenheit, and additional compatible Ebola Virus Disease symptoms Patient denies               

  exposure to infectious person Patient denies travel to an Ebola-affected area in the            

  21 days before illness onset No symptoms or risks identified at this time.                      

                                                                                                  

                                                                                                  

ROS:                                                                                              

12:08 Neck: Negative for injury, pain, and swelling, Cardiovascular: Negative for chest pain, kb  

      palpitations, and edema, Respiratory: Negative for shortness of breath, cough,              

      wheezing, and pleuritic chest pain, Abdomen/GI: Negative for abdominal pain, nausea,        

      vomiting, diarrhea, and constipation, MS/Extremity: Negative for injury and deformity,      

      Skin: Negative for injury, rash, and discoloration, Neuro: Negative for headache,           

      weakness, numbness, tingling, and seizure.                                                  

12:08 Constitutional: Positive for body aches, chills, fatigue, fever, malaise.                   

12:08 ENT: Positive for sore throat.                                                              

                                                                                                  

Exam:                                                                                             

12:08 Constitutional:  This is a well developed, well nourished patient who is awake, alert,  kb  

      and in no acute distress. Head/Face:  Normocephalic, atraumatic. Neck:  Trachea             

      midline, no thyromegaly or masses palpated, and no cervical lymphadenopathy.  Supple,       

      full range of motion without nuchal rigidity, or vertebral point tenderness.  No            

      Meningismus. Chest/axilla:  Normal chest wall appearance and motion.  Nontender with no     

      deformity.  No lesions are appreciated. Cardiovascular:  Regular rate and rhythm with a     

      normal S1 and S2.  No gallops, murmurs, or rubs.  Normal PMI, no JVD.  No pulse             

      deficits. Respiratory:  Lungs have equal breath sounds bilaterally, clear to                

      auscultation and percussion.  No rales, rhonchi or wheezes noted.  No increased work of     

      breathing, no retractions or nasal flaring. Abdomen/GI:  Soft, non-tender, with normal      

      bowel sounds.  No distension or tympany.  No guarding or rebound.  No evidence of           

      tenderness throughout. Skin:  Warm, dry with normal turgor.  Normal color with no           

      rashes, no lesions, and no evidence of cellulitis. MS/ Extremity:  Pulses equal, no         

      cyanosis.  Neurovascular intact.  Full, normal range of motion. Neuro:  Awake and           

      alert, GCS 15, oriented to person, place, time, and situation.  Cranial nerves II-XII       

      grossly intact.  Motor strength 5/5 in all extremities.  Sensory grossly intact.            

      Cerebellar exam normal.  Normal gait.                                                       

12:08 ENT: Posterior pharynx: Airway: normal, Tonsils: with erythema, Uvula: normal, midline,     

      swelling, is not appreciated, erythema, that is marked, exudate, that is mild.              

                                                                                                  

Vital Signs:                                                                                      

11:26  / 75; Pulse 118; Resp 18 S; Temp 99.7(TE); Pulse Ox 100% on R/A; Weight 53.52    iw  

      kg; Height 5 ft. 3 in. (160.02 cm); Pain 6/10;                                              

11:26 Body Mass Index 20.90 (53.52 kg, 160.02 cm)                                             iw  

                                                                                                  

MDM:                                                                                              

11:58 Patient medically screened.                                                             kb  

12:08 Data reviewed: vital signs, nurses notes. Data interpreted: Pulse oximetry: on room air kb  

      is 100 %. Interpretation: normal.                                                           

12:09 Counseling: I had a detailed discussion with the patient and/or guardian regarding: the kb  

      historical points, exam findings, and any diagnostic results supporting the                 

      discharge/admit diagnosis, lab results, the need for outpatient follow up, a family         

      practitioner, to return to the emergency department if symptoms worsen or persist or if     

      there are any questions or concerns that arise at home.                                     

                                                                                                  

                                                                                             

11:29 Order name: Strep; Complete Time: 12:04                                                 iw  

                                                                                             

11:29 Order name: Flu; Complete Time: 12:05                                                   iw  

                                                                                             

12:05 Order name: Throat Culture                                                              EDMS

                                                                                                  

Administered Medications:                                                                         

12:20 Drug: GI Cocktail without Donnatal - (Maalox Suspension 30 ml, Lidocaine Liquid 2 % 15  iw  

      ml) Route: PO;                                                                              

                                                                                                  

                                                                                                  

Disposition:                                                                                      

15:30 Co-signature as Attending Physician, Jason Brown MD I agree with the assessment and   kdr 

      plan of care.                                                                               

                                                                                                  

Disposition:                                                                                      

19 12:10 Discharged to Home. Impression: Acute pharyngitis.                                 

- Condition is Stable.                                                                            

- Discharge Instructions: Pharyngitis, Easy-to-Read, Sore Throat, Easy-to-Read.                   

                                                                                                  

- Medication Reconciliation Form, Thank You Letter, Antibiotic Education, Prescription            

  Opioid Use form.                                                                                

- Follow up: Emergency Department; When: As needed; Reason: Worsening of condition.               

  Follow up: Private Physician; When: 2 - 3 days; Reason: Recheck today's complaints,             

  Continuance of care, Re-evaluation by your physician.                                           

                                                                                                  

                                                                                                  

                                                                                                  

Signatures:                                                                                       

Dispatcher [employer]                           EDMS                                                 

Sheryl Key, DANNYP-C                 ANAI-Jason Cuevas MD MD   kdr                                                  

Jayla Brandt RN                     RN   iw                                                   

                                                                                                  

Corrections: (The following items were deleted from the chart)                                    

12:27 12:10 2019 12:10 Discharged to Home. Impression: Acute pharyngitis. Condition is  iw  

      Stable. Forms are Medication Reconciliation Form, Thank You Letter, Antibiotic              

      Education, Prescription Opioid Use. Follow up: Emergency Department; When: As needed;       

      Reason: Worsening of condition. Follow up: Private Physician; When: 2 - 3 days; Reason:     

      Recheck today's complaints, Continuance of care, Re-evaluation by your physician. kb        

                                                                                                  

**************************************************************************************************